# Patient Record
Sex: FEMALE | Race: WHITE | NOT HISPANIC OR LATINO | URBAN - METROPOLITAN AREA
[De-identification: names, ages, dates, MRNs, and addresses within clinical notes are randomized per-mention and may not be internally consistent; named-entity substitution may affect disease eponyms.]

---

## 2020-07-25 ENCOUNTER — TELEPHONE ENCOUNTER (OUTPATIENT)
Dept: URBAN - METROPOLITAN AREA CLINIC 13 | Facility: CLINIC | Age: 65
End: 2020-07-25

## 2020-07-26 ENCOUNTER — TELEPHONE ENCOUNTER (OUTPATIENT)
Dept: URBAN - METROPOLITAN AREA CLINIC 13 | Facility: CLINIC | Age: 65
End: 2020-07-26

## 2024-07-10 ENCOUNTER — OFFICE VISIT (OUTPATIENT)
Dept: URBAN - METROPOLITAN AREA CLINIC 113 | Facility: CLINIC | Age: 69
End: 2024-07-10
Payer: MEDICARE

## 2024-07-10 ENCOUNTER — DASHBOARD ENCOUNTERS (OUTPATIENT)
Age: 69
End: 2024-07-10

## 2024-07-10 VITALS
DIASTOLIC BLOOD PRESSURE: 76 MMHG | WEIGHT: 138.2 LBS | BODY MASS INDEX: 21.69 KG/M2 | RESPIRATION RATE: 20 BRPM | SYSTOLIC BLOOD PRESSURE: 110 MMHG | HEART RATE: 82 BPM | TEMPERATURE: 97.7 F | HEIGHT: 67 IN

## 2024-07-10 DIAGNOSIS — Z12.11 COLON CANCER SCREENING: ICD-10-CM

## 2024-07-10 PROCEDURE — 993 APS NON BILLABLE

## 2024-07-10 NOTE — HPI-TODAY'S VISIT:
69-year-old female presents for colonoscopy consultation.  Colonoscopy performed by Dr. Garcia in July 2018 showed excellent bowel prep, mild internal hemorrhoids, normal terminal ileum and four 3 mm sessile sigmoid and descending colon polyps.  Pathology returned as hyperplastic polyps.  Repeat colonoscopy was recommended in 10 years due to benign pathology.  Chest x-ray 7/9/2024: No acute process.  Labs 7/9/2024: Hemoglobin 14.3, hematocrit 44.4, platelets 255, normal vitamin D, normal BMP.  She was under the impression she was to repeat a colonoscopy in 5-10 years. She has no GI complaints at this time. Denies nausea, vomiting, abdominal pain. Bowel movements are regular and without blood.